# Patient Record
Sex: FEMALE | Race: WHITE | NOT HISPANIC OR LATINO | Employment: FULL TIME | URBAN - METROPOLITAN AREA
[De-identification: names, ages, dates, MRNs, and addresses within clinical notes are randomized per-mention and may not be internally consistent; named-entity substitution may affect disease eponyms.]

---

## 2017-03-20 DIAGNOSIS — J45.901 ASTHMA EXACERBATION, MILD: ICD-10-CM

## 2017-03-20 NOTE — TELEPHONE ENCOUNTER
Was the patient seen in the last year in this department? No    Does patient have an active prescription for medications requested? Yes     Received Request Via: Pharmacy     Last office visit: 02/26/2015  Last labs: 09/28/2016

## 2017-03-21 RX ORDER — ALBUTEROL SULFATE 90 UG/1
2 AEROSOL, METERED RESPIRATORY (INHALATION) EVERY 6 HOURS PRN
Qty: 18 G | Refills: 6 | OUTPATIENT
Start: 2017-03-21

## 2017-04-12 DIAGNOSIS — J45.901 ASTHMA EXACERBATION, MILD: ICD-10-CM

## 2017-04-12 NOTE — TELEPHONE ENCOUNTER
Was the patient seen in the last year in this department? No     Does patient have an active prescription for medications requested? Yes     Received Request Via: Patient    Last office visit: 0326/2015  Last labs: 03/26/2016

## 2017-04-13 RX ORDER — ALBUTEROL SULFATE 90 UG/1
2 AEROSOL, METERED RESPIRATORY (INHALATION) EVERY 6 HOURS PRN
Qty: 18 G | Refills: 6 | OUTPATIENT
Start: 2017-04-13

## 2017-05-01 ENCOUNTER — OFFICE VISIT (OUTPATIENT)
Dept: MEDICAL GROUP | Facility: MEDICAL CENTER | Age: 32
End: 2017-05-01
Payer: COMMERCIAL

## 2017-05-01 VITALS
HEART RATE: 98 BPM | HEIGHT: 69 IN | OXYGEN SATURATION: 97 % | RESPIRATION RATE: 14 BRPM | DIASTOLIC BLOOD PRESSURE: 84 MMHG | SYSTOLIC BLOOD PRESSURE: 120 MMHG | BODY MASS INDEX: 33.33 KG/M2 | TEMPERATURE: 98.2 F | WEIGHT: 225 LBS

## 2017-05-01 DIAGNOSIS — Z76.0 MEDICATION REFILL: ICD-10-CM

## 2017-05-01 DIAGNOSIS — J45.901 ASTHMA EXACERBATION, MILD: ICD-10-CM

## 2017-05-01 PROCEDURE — 99213 OFFICE O/P EST LOW 20 MIN: CPT | Performed by: PHYSICIAN ASSISTANT

## 2017-05-01 RX ORDER — ALBUTEROL SULFATE 90 UG/1
2 AEROSOL, METERED RESPIRATORY (INHALATION) EVERY 6 HOURS PRN
Qty: 18 G | Refills: 6 | Status: SHIPPED | OUTPATIENT
Start: 2017-05-01

## 2017-05-01 RX ORDER — MONTELUKAST SODIUM 10 MG/1
10 TABLET ORAL DAILY
Qty: 30 TAB | Refills: 6 | Status: SHIPPED | OUTPATIENT
Start: 2017-05-01

## 2017-05-01 ASSESSMENT — PATIENT HEALTH QUESTIONNAIRE - PHQ9: CLINICAL INTERPRETATION OF PHQ2 SCORE: 0

## 2017-05-01 NOTE — PROGRESS NOTES
Chief Complaint   Patient presents with   • Medication Refill       HISTORY OF PRESENT ILLNESS: Patient is a 32 y.o. female established patient who presents today to discuss asthma    Asthma exacerbation, mild  Chronic in nature. Positive history of reactive airway and asthma. Patient states that she does use albuterol one puff daily as needed. Patient states that she has noticed that when she does use Singulair 10 mg attends to help symptoms better. Patient states that she is also taking Zyrtec on a regular basis. Patient states that there is some definite allergy component to her asthma. Patient states she has never had to be intubated, nor hospitalized due to her asthma. Currently patient states asymptomatic. Patient does need refill on her albuterol HFA MDI.    In discussion with patient she also would like to be referred to [facility] to discuss with allergist. Patient states that she would like to have allergy profile to see what she is allergic to. Patient states that she would like to consider possibility of desensitization.    Patient Active Problem List    Diagnosis Date Noted   • History of kidney infection 03/23/2016   • Asthma exacerbation, mild 03/23/2016   • Supervision of other high-risk pregnancy 07/09/2013   • Congenital diaphragmatic hernia 05/13/2013   • Health examination of defined subpopulation 09/07/2012     Allergies:Review of patient's allergies indicates no known allergies.    Current Outpatient Prescriptions   Medication Sig Dispense Refill   • montelukast (SINGULAIR) 10 MG Tab Take 1 Tab by mouth every day. TAKE ONE TABLET BY MOUTH DAILY 30 Tab 6   • albuterol (VENTOLIN HFA) 108 (90 BASE) MCG/ACT Aero Soln inhalation aerosol Inhale 2 Puffs by mouth every 6 hours as needed for Shortness of Breath. 18 g 6     No current facility-administered medications for this visit.     Social History   Substance Use Topics   • Smoking status: Former Smoker -- 0.30 packs/day for 10 years     Types:  "Cigarettes     Quit date: 01/01/2012   • Smokeless tobacco: Never Used      Comment: quit smoking January 6, 2012   • Alcohol Use: No     No family status information on file.     Family History   Problem Relation Age of Onset   • Hypertension Mother    • Hyperlipidemia Mother    • Alcohol/Drug Maternal Uncle    • Hyperlipidemia Maternal Grandmother    • Hypertension Maternal Grandmother    • Arthritis Paternal Grandmother      Review of Systems:   Constitutional: Negative for fever, chills, weight loss and malaise/fatigue.   HENT: Negative for ear pain, nosebleeds, congestion, sore throat and neck pain.    Eyes: Negative for blurred vision.   Respiratory: Negative for cough, sputum production, shortness of breath and wheezing.    Cardiovascular: Negative for chest pain, palpitations, orthopnea and leg swelling.   Gastrointestinal: Negative for heartburn, nausea, vomiting and abdominal pain.   Genitourinary: Negative for dysuria, urgency and frequency.   Musculoskeletal: Negative for myalgias, back pain and joint pain.   Skin: Negative for rash and itching.   Neurological: Negative for dizziness, tingling, tremors, sensory change, focal weakness and headaches.   Endo/Heme/Allergies: Does not bruise/bleed easily.   Psychiatric/Behavioral: Negative for depression, suicidal ideas and memory loss.  The patient is not nervous/anxious and does not have insomnia.    All other systems reviewed and are negative except as in HPI.    Exam:  Blood pressure 120/84, pulse 98, temperature 36.8 °C (98.2 °F), resp. rate 14, height 1.753 m (5' 9.02\"), weight 102.059 kg (225 lb), last menstrual period 11/29/2012, SpO2 97 %, not currently breastfeeding.  General:  Well nourished, well developed female in NAD  Head: is grossly normal.  Neck: Supple without JVD or bruit. Thyroid is not enlarged.  Pulmonary: Clear to ausculation. Normal effort. No rales, ronchi, or wheezing.  Cardiovascular: Regular rate and rhythm without murmur. Carotid " and radial pulses are intact and equal bilaterally.  Extremities: no clubbing, cyanosis, or edema.    Medical decision-making and discussion: A 32 year young female presents to clinic today follow-up on chronic condition of asthma. Pulse oximetry 97% on room air no active signs currently. Patient breathing well, lungs are clear to auscultation. I have refilled patient's Singulair and albuterol. Referred to allergist for further evaluation.    Please note that this dictation was created using voice recognition software. I have made every reasonable attempt to correct obvious errors, but I expect that there are errors of grammar and possibly content that I did not discover before finalizing the note.    Assessment/Plan:  1. Asthma exacerbation, mild  montelukast (SINGULAIR) 10 MG Tab    albuterol (VENTOLIN HFA) 108 (90 BASE) MCG/ACT Aero Soln inhalation aerosol    REFERRAL TO ALLERGY   2. Medication refill  montelukast (SINGULAIR) 10 MG Tab    albuterol (VENTOLIN HFA) 108 (90 BASE) MCG/ACT Aero Soln inhalation aerosol    REFERRAL TO ALLERGY

## 2017-05-01 NOTE — ASSESSMENT & PLAN NOTE
Chronic in nature. Positive history of reactive airway and asthma. Patient states that she does use albuterol one puff daily as needed. Patient states that she has noticed that when she does use Singulair 10 mg attends to help symptoms better. Patient states that she is also taking Zyrtec on a regular basis. Patient states that there is some definite allergy component to her asthma. Patient states she has never had to be intubated, nor hospitalized due to her asthma. Currently patient states asymptomatic. Patient does need refill on her albuterol HFA MDI.

## 2017-09-20 ENCOUNTER — HOSPITAL ENCOUNTER (OUTPATIENT)
Dept: LAB | Facility: MEDICAL CENTER | Age: 32
End: 2017-09-20
Payer: COMMERCIAL

## 2017-09-20 LAB
BDY FAT % MEASURED: 38.7 %
BP DIAS: 75 MMHG
BP SYS: 122 MMHG
CHOLEST SERPL-MCNC: 186 MG/DL (ref 100–199)
DIABETES HTDIA: NO
EVENT NAME HTEVT: NORMAL
FASTING HTFAS: YES
GLUCOSE SERPL-MCNC: 70 MG/DL (ref 65–99)
HDLC SERPL-MCNC: 45 MG/DL
HYPERTENSION HTHYP: NO
LDLC SERPL CALC-MCNC: 129 MG/DL
SCREENING LOC CITY HTCIT: NORMAL
SCREENING LOC STATE HTSTA: NORMAL
SCREENING LOCATION HTLOC: NORMAL
SMOKING HTSMO: NO
SUBSCRIBER ID HTSID: NORMAL
TRIGL SERPL-MCNC: 60 MG/DL (ref 0–149)

## 2017-09-20 PROCEDURE — 36415 COLL VENOUS BLD VENIPUNCTURE: CPT

## 2017-09-20 PROCEDURE — 82947 ASSAY GLUCOSE BLOOD QUANT: CPT

## 2017-09-20 PROCEDURE — S5190 WELLNESS ASSESSMENT BY NONPH: HCPCS

## 2017-09-20 PROCEDURE — 80061 LIPID PANEL: CPT

## 2017-09-28 ENCOUNTER — EH NON-PROVIDER (OUTPATIENT)
Dept: OCCUPATIONAL MEDICINE | Facility: CLINIC | Age: 32
End: 2017-09-28

## 2017-09-28 DIAGNOSIS — Z29.89 NEED FOR ISOLATION: ICD-10-CM

## 2017-09-28 PROCEDURE — 94375 RESPIRATORY FLOW VOLUME LOOP: CPT

## 2018-08-09 ENCOUNTER — DOCUMENTATION (OUTPATIENT)
Dept: OCCUPATIONAL MEDICINE | Facility: CLINIC | Age: 33
End: 2018-08-09

## 2018-08-09 NOTE — PROGRESS NOTES
Respiratory questionnaire reviewed and signed. See scanned documents.    [facility].

## 2018-09-27 ENCOUNTER — EH NON-PROVIDER (OUTPATIENT)
Dept: OCCUPATIONAL MEDICINE | Facility: CLINIC | Age: 33
End: 2018-09-27

## 2018-10-25 ENCOUNTER — EH NON-PROVIDER (OUTPATIENT)
Dept: OCCUPATIONAL MEDICINE | Facility: CLINIC | Age: 33
End: 2018-10-25

## 2018-10-25 DIAGNOSIS — Z02.89 ENCOUNTER FOR OCCUPATIONAL HEALTH EXAMINATION INVOLVING RESPIRATOR: ICD-10-CM

## 2018-10-25 PROCEDURE — 94375 RESPIRATORY FLOW VOLUME LOOP: CPT | Performed by: PREVENTIVE MEDICINE

## 2018-10-25 PROCEDURE — 94010 BREATHING CAPACITY TEST: CPT | Performed by: PREVENTIVE MEDICINE

## 2018-10-29 ENCOUNTER — DOCUMENTATION (OUTPATIENT)
Dept: OCCUPATIONAL MEDICINE | Facility: CLINIC | Age: 33
End: 2018-10-29

## 2018-10-29 NOTE — PROGRESS NOTES
Seen for spirometry on 10/25/18.    Spirometry test are within normal limits for patient's stated age, height, and weight at the time of test. Patient is physically able to perform essential functions based on job title or work conditions defined at the time of evaluation without accommodations as it pertains to their respiratory status.     See scanned documents.